# Patient Record
Sex: MALE | Race: WHITE | NOT HISPANIC OR LATINO
[De-identification: names, ages, dates, MRNs, and addresses within clinical notes are randomized per-mention and may not be internally consistent; named-entity substitution may affect disease eponyms.]

---

## 2019-11-15 PROBLEM — Z00.00 ENCOUNTER FOR PREVENTIVE HEALTH EXAMINATION: Status: ACTIVE | Noted: 2019-11-15

## 2019-11-21 ENCOUNTER — APPOINTMENT (OUTPATIENT)
Dept: SURGERY | Facility: CLINIC | Age: 49
End: 2019-11-21
Payer: COMMERCIAL

## 2019-11-21 VITALS
DIASTOLIC BLOOD PRESSURE: 87 MMHG | HEART RATE: 69 BPM | BODY MASS INDEX: 26.21 KG/M2 | OXYGEN SATURATION: 100 % | HEIGHT: 69.5 IN | SYSTOLIC BLOOD PRESSURE: 127 MMHG | WEIGHT: 181 LBS

## 2019-11-21 DIAGNOSIS — Z56.0 UNEMPLOYMENT, UNSPECIFIED: ICD-10-CM

## 2019-11-21 DIAGNOSIS — Z83.3 FAMILY HISTORY OF DIABETES MELLITUS: ICD-10-CM

## 2019-11-21 PROCEDURE — 99243 OFF/OP CNSLTJ NEW/EST LOW 30: CPT

## 2019-11-21 RX ORDER — ROSUVASTATIN CALCIUM 5 MG/1
TABLET, FILM COATED ORAL
Refills: 0 | Status: ACTIVE | COMMUNITY

## 2019-11-21 SDOH — ECONOMIC STABILITY - INCOME SECURITY: UNEMPLOYMENT, UNSPECIFIED: Z56.0

## 2019-11-21 NOTE — HISTORY OF PRESENT ILLNESS
[de-identified] : Mr. REANNA GARCIA is a 49 year  old male who was referred by Dr. David Burns  with the chief complaint of having an umbilical hernia.  He reports having this condition for 2 -3 months. He denies any trauma to the area, fever, nausea, vomiting, distension, night sweats and  loss of appetite.  Symptoms aggravated by cough and straining and moving.  He reports normal bowel movements.    -He denies  previous abdominal surgeries or  wound infections.  patient reports laminectomy. \par \par \par

## 2019-11-21 NOTE — PHYSICAL EXAM
[Alert] : alert [Oriented to Person] : oriented to person [Oriented to Place] : oriented to place [Oriented to Time] : oriented to time [Calm] : calm [de-identified] : He  is alert, well-groomed, and cheerful.\par   [de-identified] :   anicteric.  Nasal mucosa pink, septum midline. Oral mucosa pink.  Tongue midline, Pharynx without exudates.\par   [de-identified] :  Neck supple. Trachea midline. Thyroid isthmus barely palpable, lobes not felt.\par   [de-identified] : reducible  umbilical hernia, mildly tender.  The defect appears to be relatively small and the skin overlying the hernia is normal. [de-identified] : limited ROM  back

## 2019-11-21 NOTE — CONSULT LETTER
[Dear  ___] : Dear  [unfilled], [Consult Letter:] : I had the pleasure of evaluating your patient, [unfilled]. [Please see my note below.] : Please see my note below. [Consult Closing:] : Thank you very much for allowing me to participate in the care of this patient.  If you have any questions, please do not hesitate to contact me. [Sincerely,] : Sincerely, [FreeTextEntry3] : Draryl Lassiter MD, FACS

## 2019-11-21 NOTE — PLAN
[FreeTextEntry1] : Mr. GARCIA  was told significance of findings, options, risks and benefits were explained.  Informed consent for umbilical hernia repair and potential risks, benefits and alternatives (surgical options were discussed including non-surgical options or the option of no surgery) to the planned surgery were discussed in depth.  All surgical options were discussed including non-surgical treatments.  He wishes to proceed with surgery.  We will plan for surgery on at the next available date, pending any required insurance pre-certification or pre-approval. He agrees to obtain any necessary pre-operative evaluations and testing prior to surgery.\par Patient advised to seek immediate medical attention with any acute change in symptoms or with the development of any new or worsening symptoms.  Patient's questions and concerns addressed to patient's satisfaction, and patient verbalized an understanding of the information discussed.\par \par

## 2020-01-03 ENCOUNTER — OUTPATIENT (OUTPATIENT)
Dept: OUTPATIENT SERVICES | Facility: HOSPITAL | Age: 50
LOS: 1 days | End: 2020-01-03
Payer: COMMERCIAL

## 2020-01-03 VITALS
WEIGHT: 184.97 LBS | HEART RATE: 74 BPM | HEIGHT: 72 IN | TEMPERATURE: 98 F | DIASTOLIC BLOOD PRESSURE: 79 MMHG | SYSTOLIC BLOOD PRESSURE: 137 MMHG | OXYGEN SATURATION: 96 % | RESPIRATION RATE: 18 BRPM

## 2020-01-03 DIAGNOSIS — K42.9 UMBILICAL HERNIA WITHOUT OBSTRUCTION OR GANGRENE: ICD-10-CM

## 2020-01-03 DIAGNOSIS — Z01.818 ENCOUNTER FOR OTHER PREPROCEDURAL EXAMINATION: ICD-10-CM

## 2020-01-03 DIAGNOSIS — Z98.890 OTHER SPECIFIED POSTPROCEDURAL STATES: Chronic | ICD-10-CM

## 2020-01-03 PROCEDURE — G0463: CPT

## 2020-01-03 NOTE — H&P PST ADULT - NEGATIVE CARDIOVASCULAR SYMPTOMS
no orthopnea/no dyspnea on exertion/no paroxysmal nocturnal dyspnea/no peripheral edema/no chest pain/no palpitations

## 2020-01-03 NOTE — H&P PST ADULT - NSICDXPROBLEM_GEN_ALL_CORE_FT
PROBLEM DIAGNOSES  Problem: Umbilical hernia without obstruction or gangrene  Assessment and Plan: Preoperative instructions given to patient with understanding verbalized for scheduled repair of umbilical hernia on 1/8/20

## 2020-01-03 NOTE — H&P PST ADULT - NEGATIVE ENMT SYMPTOMS
----- Message from Vilma Hanna sent at 2/5/2018  8:16 AM CST -----  Contact: Self  Patient saw the doctor on Friday and she put her on antibiotics and she is not feeling any better she is feeling worse.  Mucus is thicker and clear.  Please call back and advise [phone] (home).  Thank you!   no hearing difficulty/no ear pain/no tinnitus/no vertigo/no nasal congestion/no sinus symptoms

## 2020-01-03 NOTE — H&P PST ADULT - HISTORY OF PRESENT ILLNESS
49year old male with pmhx of low back pain associated with work related fall 2013 and hyperlipidemia presents with c/o periumbilical are tender mass especially noted mostly after eating a big meal. Patient is here today for presurgical testing for scheduled repair of umbilical hernia on 1/8/20

## 2020-01-07 ENCOUNTER — TRANSCRIPTION ENCOUNTER (OUTPATIENT)
Age: 50
End: 2020-01-07

## 2020-01-08 ENCOUNTER — APPOINTMENT (OUTPATIENT)
Dept: SURGERY | Facility: HOSPITAL | Age: 50
End: 2020-01-08

## 2020-01-08 ENCOUNTER — OUTPATIENT (OUTPATIENT)
Dept: OUTPATIENT SERVICES | Facility: HOSPITAL | Age: 50
LOS: 1 days | End: 2020-01-08
Payer: COMMERCIAL

## 2020-01-08 VITALS
OXYGEN SATURATION: 97 % | HEART RATE: 68 BPM | TEMPERATURE: 98 F | SYSTOLIC BLOOD PRESSURE: 114 MMHG | RESPIRATION RATE: 14 BRPM | DIASTOLIC BLOOD PRESSURE: 70 MMHG | HEIGHT: 72 IN | WEIGHT: 184.97 LBS

## 2020-01-08 VITALS — OXYGEN SATURATION: 98 % | RESPIRATION RATE: 18 BRPM | HEART RATE: 65 BPM | TEMPERATURE: 98 F

## 2020-01-08 DIAGNOSIS — Z01.818 ENCOUNTER FOR OTHER PREPROCEDURAL EXAMINATION: ICD-10-CM

## 2020-01-08 DIAGNOSIS — Z98.890 OTHER SPECIFIED POSTPROCEDURAL STATES: Chronic | ICD-10-CM

## 2020-01-08 DIAGNOSIS — K42.9 UMBILICAL HERNIA WITHOUT OBSTRUCTION OR GANGRENE: ICD-10-CM

## 2020-01-08 PROCEDURE — 49585: CPT | Mod: AS

## 2020-01-08 PROCEDURE — 49585: CPT

## 2020-01-08 RX ORDER — HYDROMORPHONE HYDROCHLORIDE 2 MG/ML
0.5 INJECTION INTRAMUSCULAR; INTRAVENOUS; SUBCUTANEOUS
Refills: 0 | Status: DISCONTINUED | OUTPATIENT
Start: 2020-01-08 | End: 2020-01-08

## 2020-01-08 RX ORDER — ROSUVASTATIN CALCIUM 5 MG/1
1 TABLET ORAL
Qty: 0 | Refills: 0 | DISCHARGE

## 2020-01-08 RX ORDER — SODIUM CHLORIDE 9 MG/ML
3 INJECTION INTRAMUSCULAR; INTRAVENOUS; SUBCUTANEOUS EVERY 8 HOURS
Refills: 0 | Status: DISCONTINUED | OUTPATIENT
Start: 2020-01-08 | End: 2020-01-08

## 2020-01-08 RX ORDER — ONDANSETRON 8 MG/1
4 TABLET, FILM COATED ORAL ONCE
Refills: 0 | Status: DISCONTINUED | OUTPATIENT
Start: 2020-01-08 | End: 2020-01-08

## 2020-01-08 RX ORDER — SODIUM CHLORIDE 9 MG/ML
1000 INJECTION, SOLUTION INTRAVENOUS
Refills: 0 | Status: DISCONTINUED | OUTPATIENT
Start: 2020-01-08 | End: 2020-01-08

## 2020-01-08 NOTE — ASU DISCHARGE PLAN (ADULT/PEDIATRIC) - CARE PROVIDER_API CALL
Darryl Lassiter)  Surgery  [address] [facility], [place] [address]  Phone: [phone]  Fax: [phone]  Follow Up Time:

## 2020-01-13 PROBLEM — M54.5 LOW BACK PAIN: Chronic | Status: ACTIVE | Noted: 2020-01-03

## 2020-01-13 PROBLEM — E78.5 HYPERLIPIDEMIA, UNSPECIFIED: Chronic | Status: ACTIVE | Noted: 2020-01-03

## 2020-01-16 PROBLEM — K42.9 HERNIA, UMBILICAL: Status: ACTIVE | Noted: 2019-11-21

## 2020-01-23 ENCOUNTER — APPOINTMENT (OUTPATIENT)
Dept: SURGERY | Facility: CLINIC | Age: 50
End: 2020-01-23
Payer: COMMERCIAL

## 2020-01-23 DIAGNOSIS — K42.9 UMBILICAL HERNIA W/OUT OBSTRUCTION OR GANGRENE: ICD-10-CM

## 2020-01-23 PROCEDURE — 99024 POSTOP FOLLOW-UP VISIT: CPT

## 2020-01-23 NOTE — HISTORY OF PRESENT ILLNESS
[de-identified] : Mr. GARCIA  is s/p umbilical hernia repair on 01/08/2020.  Today  Mr. GARCIA offers no complaints. patient reports no fever, chills,  or  pain.  His surgical wound is healing well. No signs of inflammation, infection or exudate.  Patient reports good bowel movements and appetite. \par \par

## 2020-01-23 NOTE — PHYSICAL EXAM
[de-identified] : He  is alert, well-groomed, and cheerful.\par   [de-identified] : Surgical wound is healing well.   no signs of  inflammation or infection. no recurrence

## 2020-01-23 NOTE — ASSESSMENT
[FreeTextEntry1] : Mr. GARCIA is doing well, with excellent post-operative recovery. All surgical incisions are healing well and as expected. There is no evidence of infection or complication, and he is progressing as expected. Post-operative wound care, activity, restrictions and precautions reinforced. Patient instructed to refrain from any heavy lifting greater than 10-15 pounds for at least 4-6 weeks post-operatively. Patient's questions and concerns addressed to patient's satisfaction.\par

## 2024-01-08 ENCOUNTER — NON-APPOINTMENT (OUTPATIENT)
Age: 54
End: 2024-01-08

## 2024-05-15 NOTE — ASU DISCHARGE PLAN (ADULT/PEDIATRIC) - PHYSICIAN SECTION COMPLETE
OFFICE VISIT      Patient: Porfirio Vázquez   : 1963 MRN: [id number]    SUBJECTIVE:  Chief Complaint   Patient presents with    Diabetes    Medication Management       A 61 year old male is here for a follow-up of multiple medical conditions.    The recording was initiated after a verbal consent was obtained from the patient to record this visit for documentation in their clinical record.    HISTORY OF PRESENT ILLNESS:    Hypothyroidism due to Hashimoto's thyroiditis: Mentions during the last refills of the SYNTHROID®, he was given a box gel cap. Inquires if it is right to take him off Synthroid. He has been on Synthroid for 15-20 years.     Type 2 diabetes mellitus with diabetic polyneuropathy, without long-term current use of insulin: Hemoglobin A1C, POC 6.7 %, previously was 9.1 %. Is watching his diet because he is unable to walk much. He does not drink much alcohol. He left his medication on the cruise, has been two weeks without it. Is leaving on May 16, 2024, to [place] for two weeks for fishing. Does need medication refills early.      Essential hypertension: Is on Metroprolol 50 mg, Amlodipine 5 mg, and Benicar 40 mg, takes it once a day, but takes one tablet in the morning and two tablets at night. His blood pressure was 144/98 mmHg today. Repeat blood pressure was 140/94 mmHg today.     DDD (degenerative disc disease), lumbar: Has a known history.     Sensory motor neuropathy; Neuropathy involving both lower extremities: Reports worsening symptoms of his feet. He took a cruise and had to use a wheelchair in the airport. [place], he is fine, and takes him own time, Mercedes stays 10 miles ahead of him. Is doing pain management with Dr. Crump. He had an MRI, but he is claustrophobic, so he took a pill. He has two MRI scheduled in  for thoracic spine and lumbar spine, and then is planning for stimulator, asks for opinion on it. Mentions he has gained 8 lb. No steroid blocks have been done yet. Mentions the  physical therapist at [place] did a three-hour test on ability, emotion, and strength. He retired early because he was unable to do his job. Is on maximum dose of Lyrica 150 mg, three times a day without benefits, so he told Dr. Ho, but is moving to [place]; however, has seen the PA three times. They tried increasing his medication dose, but it causes dry mouth. Concerns if the numbness radiates up in his legs. Currently, the numbness has reached at the top of his feet, was at bottom, toes. He had seen the podiatrist, wears new shoes and sole. Feels comfortable wearing Sketchers, he wears the special socks which help. Wearing normal socks squeezes his feet. It will be two years.  He has been to three cruises since January 1, 2024. Is on Ropinirole 1 mg nightly, is not helpful for neuropathy. Taking Tylenol one tablet at night helps him with pain.     PAST MEDICAL HISTORY:  Past Medical History:   Diagnosis Date    Benign prostatic hyperplasia with incomplete bladder emptying 9/14/2022    Class 3 severe obesity due to excess calories with serious comorbidity and body mass index (BMI) of 40.0 to 44.9 in adult  (CMD)     DDD (degenerative disc disease), lumbar     Lumbar facet arthropathySpinal stenosis of lumbar region with neurogenic claudication [M48.062]    Diabetic polyneuropathy associated with type 2 diabetes mellitus  (CMD) 2/22/2023    Diverticulosis     Empyema lung  (CMD) 11/2012    Esophageal reflux     Essential hypertension     History of kidney stones     calcium oxylate stones    Hyperlipidemia LDL goal <70     Hypothyroidism due to Hashimoto's thyroiditis     Low testosterone in male     Mild intermittent asthma with (acute) exacerbation (CMD)     Mixed stress and urge urinary incontinence 01/26/2022    CARLOS on CPAP 11/2012    Tubular adenoma of colon      MEDICATIONS:  Current Outpatient Medications   Medication Sig    carbidopa-levodopa (SINEMET)  MG per tablet Take 1 tablet by mouth at  bedtime.    amLODIPine (NORVASC) 10 MG tablet Take 1 tablet by mouth daily.    HYDROcodone-acetaminophen (Norco)  MG per tablet Take 1 tablet by mouth every 6 hours as needed for Pain.    Semaglutide, 2 MG/DOSE, (Ozempic, 2 MG/DOSE,) 8 MG/3ML Solution Pen-injector Inject 2 mg into the skin every 7 days. Indications: Type 2 Diabetes    hyoscyamine (LEVSIN SL) 0.125 MG sublingual tablet PLACE 1 TABLET UNDER THE TONGUE AND ALLOW TO DISSOLVE EVERY 8 HOURS AS NEEDED FOR BLADDER SPASMS    diazePAM (VALIUM) 5 MG tablet Take 1-2 tabs one hour prior to procedure    pregabalin (LYRICA) 50 MG capsule Take 1 capsule by mouth in the morning and 1 capsule at noon and 1 capsule in the evening.    Levothyroxine Sodium 200 MCG Cap Take 200 mcg by mouth daily.    Alpha-Lipoic Acid 600 MG Tab Take 1-2 tabs daily.    pregabalin (LYRICA) 100 MG capsule Gradually increase to 200mg three times daily    solifenacin (VESICARE) 10 MG tablet Take 1 tablet by mouth daily.    metoPROLOL succinate (TOPROL-XL) 50 MG 24 hr tablet TAKE 1 TABLET DAILY    olmesartan (BENICAR) 40 MG tablet TAKE 1 TABLET DAILY    atorvastatin (LIPITOR) 40 MG tablet TAKE 1 TABLET DAILY    amitriptyline (ELAVIL) 25 MG tablet Take 1 tablet by mouth nightly. Can increase to 50mg nightly after 3-4 nights    metformin (GLUCOPHAGE) 1000 MG tablet Take 1 tablet by mouth in the morning and 1 tablet in the evening. Take with meals.    allopurinol (ZYLOPRIM) 100 MG tablet Take 1 tablet by mouth daily.    ZINC SULFATE PO Take 50 mg by mouth.    aspirin (ASPIRIN LOW DOSE) 81 MG EC tablet Take 1 tablet by mouth daily.    Blood Glucose Monitoring Suppl (Accu-Chek Dina Connect) w/Device Kit 1 each 2 times daily. Use to check blood sugar twice daily as directed.    blood glucose (Accu-Chek Dina Plus) test strip Test blood sugar two times daily as directed. Diagnosis: E11.9. Meter: Accu check Dina    Accu-Chek FastClix Lancets Misc Use to check blood sugar twice daily as  directed.    sharps container Use to dispose of sharps.    Syringe/Needle, Disp, (SafeSnap Syringe) 23G X 1-1/2\" 3 ML Misc Use every 2 weeks as directed to inject Testosterone.    Ascorbic Acid (VITAMIN C PO) Take by mouth daily.     VITAMIN D PO Take by mouth daily.     Ferrous Sulfate (IRON PO) Take by mouth daily.      No current facility-administered medications for this visit.     ALLERGIES:  ALLERGIES:   Allergen Reactions    Bee Sting SWELLING     PAST SURGICAL HISTORY:  Past Surgical History:   Procedure Laterality Date    Colonoscopy  2016    Next is     Colonoscopy  2022    repeat in 5yrs  tubular adenoma    Colonoscopy w/ biopsies and polypectomy  2011    Extracorporeal shock wave lithotripsy  2008    Lung decortication  10/24/2012    Thoracoscopy right lung    Tonsillectomy and adenoidectomy      Ureteroscopy  10/01/2007    with lithotripsy     FAMILY HISTORY:  Family History   Problem Relation Age of Onset    Cancer, Breast Mother     Hyperlipidemia Mother          at 94    Hypertension Father     Diabetes Father     Myasthenia gravis Father     Other Sister         bladder sling    Motor Vehicle Accident Sister     Hypertension Brother     Myocardial Infarction Brother 68     SOCIAL HISTORY:  Social History     Tobacco Use   Smoking Status Former    Current packs/day: 0.00    Average packs/day: 0.5 packs/day for 25.0 years (12.5 ttl pk-yrs)    Types: Cigarettes    Start date: 1981    Quit date: 2006    Years since quittin.7   Smokeless Tobacco Never     Social History     Substance and Sexual Activity   Alcohol Use Yes    Comment: once weekly        Review Of Systems:  Constitutional: Per HPI.  Endocrine: As Per HPI.  Musculoskeletal: As Per HPI.  Neurological: As Per HPI.    OBJECTIVE:  Vitals:    24 0855 24 0857   BP: (!) 144/98 (!) 140/94   BP Location: LUE - Left upper extremity LUE - Left upper extremity   Patient Position: Sitting Sitting    Cuff Size: Large Adult Large Adult   Pulse: 74    Resp: 18    SpO2: 98%    Weight: (!) 148.3 kg (327 lb)    Height: 6' 1\"        Body mass index is 43.14 kg/m².    PHYSICAL EXAM  Constitutional: In no acute distress.   Eyes: The conjunctiva exhibited no abnormalities. The sclera was normal  Psychiatric: Oriented to time, place, and person. The mood was normal. The affect was normal. The memory was unimpaired.  Skin: Skin moisture and turgor normal.    DIAGNOSTIC STUDIES:      ASSESSMENT AND PLAN:  This is a 61 year old male who presents with :  1. Hypothyroidism due to Hashimoto's thyroiditis    2. Type 2 diabetes mellitus with diabetic polyneuropathy, without long-term current use of insulin  (CMD)    3. Essential hypertension    4. DDD (degenerative disc disease), lumbar    5. Sensory motor neuropathy    6. Neuropathy involving both lower extremities        Orders Placed This Encounter    POCT Glycohemoglobin Analyzer    EMG - Routine    carbidopa-levodopa (SINEMET)  MG per tablet    amLODIPine (NORVASC) 10 MG tablet    HYDROcodone-acetaminophen (Norco)  MG per tablet    Semaglutide, 2 MG/DOSE, (Ozempic, 2 MG/DOSE,) 8 MG/3ML Solution Pen-injector       Plan:  Hypothyroidism due to Hashimoto's thyroiditis:  Medications reviewed and reconciled.  Discussed taking the Synthroid or Levothyroxine as long as the dose is same.   Will watch labs closely.     Type 2 diabetes mellitus with diabetic polyneuropathy, without long-term current use of insulin:  Currently, improving.   Reviewed and discussed previous reports.  Medications reviewed and reconciled.  Ordered and obtained POCT Glycohemoglobin Analyzer. Refer to orders.  Continue current management.   Refilled Ozempic, 2 MG/DOSE 8 MG/3ML Solution Pen-injector. Inject 2 mg into the skin every 7 days.   Continue with Metformin.      Essential hypertension:  Repeat blood pressure was 140/94 mmHg today.   Reviewed and discussed previous reports.  Medications  reviewed and reconciled.  Increased dosage of NORVASC from 5 mg to 10 MG. Take 1 tablet by mouth daily. Refills provided and prescription updated.     DDD (degenerative disc disease), lumbar:  Medications reviewed and reconciled.  Continue current management.   Refilled Norco  MG per tablet. Take 1 tablet by mouth every 6 hours as needed for Pain.    Sensory motor neuropathy; Neuropathy involving both lower extremities:    Currently, unstable.   Medications reviewed and reconciled.  Ordered EMG - Routine. Refer to orders.  Prescribed SINEMET  MG per tablet. Take 1 tablet by mouth at bedtime.  Switched from Ropinirole 1 mg to SINEMET  MG per tablet.   Discontinued Ropinirole 1 mg; medication list updated.   Informed no pain Stimulator to be done until I see MRI and EMG reports.   Etiology, pathophysiology, treatment protocols, and prognosis discussed.      Refer to orders.  Medical compliance with plan discussed and risks of non-compliance reviewed.  Patient education completed on disease process, etiology & prognosis.  Proper usage and side effects of medications reviewed & discussed.  Patient understands and agrees with the plan.  Return to clinic as clinically indicated as discussed with patient who verbalized understanding of the plan and is in agreement with the plan.    Return in about 3 months (around 8/14/2024).    [employer], have created a visit summary document based on the audio recording between Dr. Terry Perez MD and this patient for the physician to review, edit as needed, and authenticate.  Creation Date: 5/15/2024    Terry HILL MD have reviewed and edited the visit summary above and attest that it is accurate.  The documentation recorded by the scribe accurately and completely reflects the service(s) I personally performed and the decisions made by me.      Yes

## 2024-07-17 ENCOUNTER — NON-APPOINTMENT (OUTPATIENT)
Age: 54
End: 2024-07-17